# Patient Record
Sex: MALE | URBAN - METROPOLITAN AREA
[De-identification: names, ages, dates, MRNs, and addresses within clinical notes are randomized per-mention and may not be internally consistent; named-entity substitution may affect disease eponyms.]

---

## 2021-07-13 ENCOUNTER — OFFICE VISIT (OUTPATIENT)
Dept: URBAN - METROPOLITAN AREA CLINIC 23 | Facility: CLINIC | Age: 76
End: 2021-07-13
Payer: COMMERCIAL

## 2021-07-13 DIAGNOSIS — Z96.1 PRESENCE OF INTRAOCULAR LENS: Primary | ICD-10-CM

## 2021-07-13 DIAGNOSIS — H52.4 PRESBYOPIA: ICD-10-CM

## 2021-07-13 PROCEDURE — 99203 OFFICE O/P NEW LOW 30 MIN: CPT | Performed by: OPTOMETRIST

## 2021-07-13 ASSESSMENT — KERATOMETRY
OD: 28.25
OS: 42.75

## 2021-07-13 ASSESSMENT — VISUAL ACUITY
OS: 20/25
OD: 20/25

## 2021-07-13 ASSESSMENT — INTRAOCULAR PRESSURE
OD: 15
OS: 19

## 2023-12-11 NOTE — IMPRESSION/PLAN
Impression: Presbyopia: H52.4. Plan: Discussed diagnosis in detail with patient. New glasses Rx was given today. Opt out